# Patient Record
Sex: FEMALE | Race: BLACK OR AFRICAN AMERICAN | Employment: UNEMPLOYED | URBAN - METROPOLITAN AREA
[De-identification: names, ages, dates, MRNs, and addresses within clinical notes are randomized per-mention and may not be internally consistent; named-entity substitution may affect disease eponyms.]

---

## 2017-04-18 ENCOUNTER — HOSPITAL ENCOUNTER (OUTPATIENT)
Dept: VASCULAR SURGERY | Age: 72
Discharge: HOME | End: 2017-04-18
Attending: INTERNAL MEDICINE
Payer: MEDICARE

## 2017-04-18 ENCOUNTER — HOSPITAL ENCOUNTER (OUTPATIENT)
Dept: MRI IMAGING | Age: 72
Discharge: HOME | End: 2017-04-18
Attending: INTERNAL MEDICINE
Payer: MEDICARE

## 2017-04-18 ENCOUNTER — HOSPITAL ENCOUNTER (OUTPATIENT)
Dept: NON INVASIVE DIAGNOSTICS | Age: 72
Discharge: HOME | End: 2017-04-18
Attending: INTERNAL MEDICINE
Payer: MEDICARE

## 2017-04-18 DIAGNOSIS — R53.83 FATIGUE: ICD-10-CM

## 2017-04-18 DIAGNOSIS — C54.1 ENDOMETRIAL SARCOMA (HCC): ICD-10-CM

## 2017-04-18 LAB — CREAT UR-MCNC: 0.9 MG/DL (ref 0.6–1.3)

## 2017-04-18 PROCEDURE — 74011250636 HC RX REV CODE- 250/636: Performed by: INTERNAL MEDICINE

## 2017-04-18 PROCEDURE — 82565 ASSAY OF CREATININE: CPT

## 2017-04-18 PROCEDURE — 93017 CV STRESS TEST TRACING ONLY: CPT

## 2017-04-18 PROCEDURE — 72156 MRI NECK SPINE W/O & W/DYE: CPT

## 2017-04-18 PROCEDURE — 93880 EXTRACRANIAL BILAT STUDY: CPT

## 2017-04-18 PROCEDURE — A9585 GADOBUTROL INJECTION: HCPCS | Performed by: INTERNAL MEDICINE

## 2017-04-18 RX ADMIN — GADOBUTROL 7.5 ML: 604.72 INJECTION INTRAVENOUS at 12:54

## 2017-04-18 NOTE — PROCEDURES
[facility]  *** FINAL REPORT ***    Name: Maddy Rodriguez  MRN: [id number]    Outpatient  : 1945  HIS Order #: [id number]  [address] [facility] Visit #: [id number]  Date: 2017    TYPE OF TEST: Cerebrovascular Duplex    REASON FOR TEST  Transient ischemic attacks    Right Carotid:-             Proximal               Mid                 Distal  cm/s  Systolic  Diastolic  Systolic  Diastolic  Systolic  Diastolic  CCA:     98.3      17.0                            57.0      15.0  Bulb:  ECA:     78.0      10.0  ICA:     56.0      12.0       56.0      17.0       79.0      27.0  ICA/CCA:  0.9       1.6    ICA Stenosis: <50%    Right Vertebral:-  Finding: Antegrade  Sys:       56.0  Diana:       19.0    Right Subclavian: Normal    Left Carotid:-            Proximal                Mid                 Distal  cm/s  Systolic  Diastolic  Systolic  Diastolic  Systolic  Diastolic  CCA:    859.0      19.0                            61.0      16.0  Bulb:  ECA:     44.0       9.0  ICA:     36.0      10.0       50.0      19.0       54.0      25.0  ICA/CCA:  0.5       1.3    ICA Stenosis: <50%    Left Vertebral:-  Finding: Antegrade  Sys:       35.0  Diana:       13.0    Left Subclavian: Normal    INTERPRETATION/FINDINGS  Duplex images were obtained using 2-D gray scale, color flow, and  spectral Doppler analysis. 1. Bilateral <50% stenosis of the internal carotid arteries. 2. No significant stenosis in the external carotid arteries  bilaterally. 3. Antegrade flow in both vertebral arteries. 4. Normal flow in both subclavian arteries. Plaque Morphology:  1. Homogeneous plaque in the bulb and right ICA. 2. Homogeneous plaque in the bulb and left ICA. ADDITIONAL COMMENTS    I have personally reviewed the data relevant to the interpretation of  this  study. TECHNOLOGIST: Melina Moreno  Signed: 2017 12:09 PM    PHYSICIAN: Rogers Saxena MD  Signed: 2017 02:58 PM

## 2017-04-20 LAB
ATTENDING PHYSICIAN, CST07: NORMAL
DIAGNOSIS, 93000: NORMAL
DUKE TM SCORE RESULT, CST14: NORMAL
DUKE TREADMILL SCORE, CST13: -2
ECG INTERP BEFORE EX, CST11: NORMAL
ECG INTERP DURING EX, CST12: NORMAL
FUNCTIONAL CAPACITY, CST17: NORMAL
KNOWN CARDIAC CONDITION, CST08: NORMAL
MAX. DIASTOLIC BP, CST04: 75 MMHG
MAX. HEART RATE, CST05: 136 BPM
MAX. SYSTOLIC BP, CST03: 121 MMHG
OVERALL BP RESPONSE TO EXERCISE, CST16: NORMAL
OVERALL HR RESPONSE TO EXERCISE, CST15: NORMAL
PEAK EX METS, CST10: 4.6 METS
PROTOCOL NAME, CST01: NORMAL
TEST INDICATION, CST09: NORMAL

## 2017-10-16 NOTE — PROGRESS NOTES
[facility]  [address] [place] [address], [address] [facility], [address] [place] [address]  [address] [facility] ([place], [address] [place] [address]  [place], [address]   [phone]  Tamela Loo       Patient ID:  Name:  Ceil Landau  MRN:  [id number]  :  1945/72 y.o. Date:  10/17/2017      HISTORY OF PRESENT ILLNESS:  Ceil Landau is a 67 y. o.  postmenopausal female referred by Dr. 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 Patient is s/p TLH/BSO 1/19/15, along with adjuvant treatment sandwich regimen of 3 cycles Carbo/Taxol, WPRT, 3 additional cycles Carbo/Taxol, completed 10/12/15. Last PETCT , ODALIS. Denies Vaginal bleeding, change in vaginal discharge, new abdominopelvic pain, change in bladder/bowel habits    Labs:  None      Imaging  PETCT 17 ODALIS  No suspicious hypermetabolic lesion to suggest recurrent neoplasm or  metastatic disease.  Two nodules in the left lower lobe which are not  hypermetabolic and not significantly changed.  The 6 mm mesenteric nodule  in the upper pelvis is no longer visualized.  Persistent diffuse increased  tracer uptake associated with the thyroid gland which has not significantly  changed.        ROS:   As above      Patient Active Problem List    Diagnosis Date Noted    Cancer ([address]     Sinus tachycardia     Hypertension      Past Medical History:   Diagnosis Date    Anxiety     Arthropathy     Cancer ([address]     Stage 1 breast cancer, treating with chemotherapy    Cataract     Dry eyes     Fatigue     related to chemo    Herniated cervical disc     History of endometrial cancer     Hx: UTI (urinary tract infection)     Hypothyroidism     OA (osteoarthritis)     mild    Sinus tachycardia       Past Surgical History:   Procedure Laterality Date    COLONOSCOPY      HX BREAST LUMPECTOMY  2010    left    HX COLONOSCOPY      polyp removal x2    HX HYSTERECTOMY 2015    HX SALPINGO-OOPHORECTOMY Bilateral 2015    HX TONSIL AND ADENOIDECTOMY      [place] HAND/FINGER SURGERY UNLISTED      hand 6th finger on both hands removed      OB History      Para Term  AB Living    0 0 0 0 0 0    SAB TAB Ectopic Molar Multiple Live Births    0 0 0 0 0 0        Social History   Substance Use Topics    Smoking status: Never Smoker    Smokeless tobacco: Never Used    Alcohol use No      Family History   Problem Relation Age of Onset    Hypertension Mother     Diabetes Mother     Celiac Disease Sister     Lung Disease Sister      pulmonary fibrosis    Heart Disease Paternal Grandfather       Current Outpatient Prescriptions   Medication Sig    glutamine 500 mg tab Take 1,000 mg by mouth.  lorazepam (ATIVAN) 1 mg tablet Take 1 mg by mouth two (2) times a day.  calcium-magnesium-zinc 333-133-5 mg Tab Take 1 Tab by mouth daily.  cholecalciferol, vitamin d3, (VITAMIN D) 1,000 unit tablet Take 3,000 Units by mouth daily.  b complex vitamins (VITAMIN B COMPLEX) tablet Take 1 Tab by mouth daily.  letrozole (FEMARA) 2.5 mg tablet Take 2.5 mg by mouth daily. No current facility-administered medications for this visit. Allergies   Allergen Reactions    Latex Hives    Pcn [Penicillins] Itching          OBJECTIVE:    Physical Exam  VITAL SIGNS: Visit Vitals    /80 (BP 1 Location: Right arm, BP Patient Position: Sitting)    Pulse 93    Temp 98.5 °F (36.9 °C) (Oral)    Ht 4' 11.5\" (1.511 m)    Wt 74.6 kg (164 lb 6.4 oz)    LMP  (LMP Unknown)    SpO2 97%    BMI 32.65 kg/m2      GENERAL JES: in no apparent distress and well developed and well nourished   MUSCULOSKEL: no joint tenderness, deformity or swelling   INTEGUMENT:  warm and dry, no rashes or lesions   ABDOMEN . soft, NT, ND, No masses appreciated   EXTREMITIES: extremities normal, atraumatic, no cyanosis or edema   PELVIC: External genitalia: normal general appearance  Vaginal: atrophic mucosa  Cervix: removed surgically  Adnexa: removed surgically  Uterus: removed surgically   RECTAL: deferred   MARCELLO SURVEY: Cervical, supraclavicular, axillary and inguinal nodes normal.   NEURO: Grossly normal         IMPRESSION/PLAN:  1. Stage IA serous endometrial cancer who is clinically ODALIS   -reviewed signs/symptoms of recurrence   -discussed surveillance strategy and since 2 years since completion of treatment can space out surveillance visits to every 6 months   -pap obtained today   -will f/u in 6 months      The total time spent was 60 minutes regarding this patients diagnosis of endometrial cancer and >50% of this time was spent counseling and coordinating care    [address] [facility]  [id number] PM

## 2017-10-17 ENCOUNTER — HOSPITAL ENCOUNTER (OUTPATIENT)
Dept: LAB | Age: 72
Discharge: HOME | End: 2017-10-17
Payer: MEDICARE

## 2017-10-17 ENCOUNTER — OFFICE VISIT (OUTPATIENT)
Dept: ONCOLOGY | Age: 72
End: 2017-10-17

## 2017-10-17 VITALS
HEIGHT: 60 IN | SYSTOLIC BLOOD PRESSURE: 133 MMHG | BODY MASS INDEX: 32.28 KG/M2 | TEMPERATURE: 98.5 F | WEIGHT: 164.4 LBS | OXYGEN SATURATION: 97 % | DIASTOLIC BLOOD PRESSURE: 80 MMHG | HEART RATE: 93 BPM

## 2017-10-17 DIAGNOSIS — C54.1 ENDOMETRIAL CANCER (HCC): Primary | ICD-10-CM

## 2017-10-17 PROCEDURE — 88175 CYTOPATH C/V AUTO FLUID REDO: CPT | Performed by: OBSTETRICS & GYNECOLOGY

## 2021-07-28 ENCOUNTER — HOSPITAL ENCOUNTER (OUTPATIENT)
Dept: LAB | Age: 76
Discharge: HOME | End: 2021-07-28
Payer: MEDICARE

## 2021-07-28 LAB — VIT B12 SERPL-MCNC: 863 PG/ML (ref 211–911)

## 2021-07-28 PROCEDURE — 82607 VITAMIN B-12: CPT

## 2021-07-28 PROCEDURE — 82525 ASSAY OF COPPER: CPT

## 2021-07-28 PROCEDURE — 36415 COLL VENOUS BLD VENIPUNCTURE: CPT

## 2021-07-28 PROCEDURE — 82784 ASSAY IGA/IGD/IGG/IGM EACH: CPT

## 2021-07-30 LAB
COPPER SERPL-MCNC: 108 UG/DL (ref 80–158)
FAX TO INFO,FAXT: NORMAL
FAX TO NUMBER,FAXN: NORMAL
IGA SERPL-MCNC: 283 MG/DL (ref 64–422)
IGG SERPL-MCNC: 1085 MG/DL (ref 586–1602)
IGM SERPL-MCNC: 56 MG/DL (ref 26–217)
PROT PATTERN SERPL IFE-IMP: NORMAL

## 2023-05-12 RX ORDER — LORAZEPAM 1 MG/1
TABLET ORAL 2 TIMES DAILY
COMMUNITY

## 2023-05-12 RX ORDER — LETROZOLE 2.5 MG/1
2.5 TABLET, FILM COATED ORAL DAILY
COMMUNITY